# Patient Record
Sex: MALE | Race: WHITE | NOT HISPANIC OR LATINO | Employment: FULL TIME | URBAN - METROPOLITAN AREA
[De-identification: names, ages, dates, MRNs, and addresses within clinical notes are randomized per-mention and may not be internally consistent; named-entity substitution may affect disease eponyms.]

---

## 2021-01-13 ENCOUNTER — OFFICE VISIT - HEALTHEAST (OUTPATIENT)
Dept: FAMILY MEDICINE | Facility: CLINIC | Age: 56
End: 2021-01-13

## 2021-01-13 DIAGNOSIS — Z00.00 ROUTINE GENERAL MEDICAL EXAMINATION AT A HEALTH CARE FACILITY: ICD-10-CM

## 2021-01-13 DIAGNOSIS — E66.3 OVERWEIGHT: ICD-10-CM

## 2021-01-13 DIAGNOSIS — D12.6 SERRATED ADENOMA OF COLON: ICD-10-CM

## 2021-01-13 DIAGNOSIS — J30.81 CAT ALLERGIES: ICD-10-CM

## 2021-01-13 DIAGNOSIS — Q66.50 CONGENITAL PES PLANUS, UNSPECIFIED LATERALITY: ICD-10-CM

## 2021-01-13 DIAGNOSIS — Z12.5 SCREENING FOR PROSTATE CANCER: ICD-10-CM

## 2021-01-13 DIAGNOSIS — Z11.4 ENCOUNTER FOR SCREENING FOR HIV: ICD-10-CM

## 2021-01-13 DIAGNOSIS — Z11.59 NEED FOR HEPATITIS C SCREENING TEST: ICD-10-CM

## 2021-01-13 DIAGNOSIS — D49.2 ATYPICAL SQUAMOPROLIFERATIVE SKIN LESION: ICD-10-CM

## 2021-01-13 DIAGNOSIS — D12.6 TUBULAR ADENOMA OF COLON: ICD-10-CM

## 2021-01-13 LAB
CHOLEST SERPL-MCNC: 161 MG/DL
FASTING STATUS PATIENT QL REPORTED: YES
FASTING STATUS PATIENT QL REPORTED: YES
GLUCOSE BLD-MCNC: 87 MG/DL (ref 70–125)
HCV AB SERPL QL IA: NEGATIVE
HDLC SERPL-MCNC: 58 MG/DL
HIV 1+2 AB+HIV1 P24 AG SERPL QL IA: NEGATIVE
LDLC SERPL CALC-MCNC: 93 MG/DL
PSA SERPL-MCNC: 1.2 NG/ML (ref 0–3.5)
TRIGL SERPL-MCNC: 49 MG/DL

## 2021-01-13 ASSESSMENT — MIFFLIN-ST. JEOR: SCORE: 1751.85

## 2021-02-03 ENCOUNTER — AMBULATORY - HEALTHEAST (OUTPATIENT)
Dept: FAMILY MEDICINE | Facility: CLINIC | Age: 56
End: 2021-02-03

## 2021-02-03 DIAGNOSIS — D36.11 NEUROFIBROMA OF NECK: ICD-10-CM

## 2021-02-05 LAB
LAB AP CHARGES (HE HISTORICAL CONVERSION): NORMAL
PATH REPORT.COMMENTS IMP SPEC: NORMAL
PATH REPORT.FINAL DX SPEC: NORMAL
PATH REPORT.GROSS SPEC: NORMAL
PATH REPORT.MICROSCOPIC SPEC OTHER STN: NORMAL
PATH REPORT.RELEVANT HX SPEC: NORMAL
RESULT FLAG (HE HISTORICAL CONVERSION): NORMAL

## 2021-04-14 ENCOUNTER — AMBULATORY - HEALTHEAST (OUTPATIENT)
Dept: NURSING | Facility: CLINIC | Age: 56
End: 2021-04-14

## 2021-05-05 ENCOUNTER — AMBULATORY - HEALTHEAST (OUTPATIENT)
Dept: NURSING | Facility: CLINIC | Age: 56
End: 2021-05-05

## 2021-06-01 ENCOUNTER — RECORDS - HEALTHEAST (OUTPATIENT)
Dept: ADMINISTRATIVE | Facility: CLINIC | Age: 56
End: 2021-06-01

## 2021-06-05 VITALS
WEIGHT: 199 LBS | HEIGHT: 71 IN | OXYGEN SATURATION: 97 % | SYSTOLIC BLOOD PRESSURE: 100 MMHG | DIASTOLIC BLOOD PRESSURE: 68 MMHG | HEART RATE: 55 BPM | TEMPERATURE: 97.3 F | BODY MASS INDEX: 27.86 KG/M2

## 2021-06-05 VITALS
DIASTOLIC BLOOD PRESSURE: 70 MMHG | WEIGHT: 201 LBS | TEMPERATURE: 97.4 F | HEART RATE: 71 BPM | SYSTOLIC BLOOD PRESSURE: 100 MMHG | BODY MASS INDEX: 28.43 KG/M2 | OXYGEN SATURATION: 97 %

## 2021-06-14 NOTE — PROGRESS NOTES
Assessment/Plan:     1. Routine general medical examination at a health care facility  Routine healthcare maintenance.  Preventative cares reviewed.  Immunizations reviewed and patient would like to defer Shingrix immunization series until after COVID-19 pandemic in case of future vaccine availability.  Yearly physical exams to continue.  Has completed healthcare directives previously.    2. Overweight  Overweight status reviewed.  Has lost 10 pounds since September 22, 2016 due to dietary changes during current COVID-19 pandemic.  Continue encouragement for weight goal less than 190 pounds initially, less than 185 pounds ideally.  Check lipid cascade and fasting glucose.  - Lipid Cascade  - Glucose    3. Serrated adenoma of colon  Serrated adenoma of colon and tubular adenoma of colon on colonoscopy December 2, 2016 and will repeat at 5-year interval.    4. Tubular adenoma of colon  Serrated adenoma of colon and tubular adenoma of colon on colonoscopy December 2, 2016 and will repeat at 5-year interval.    5. Congenital pes planus, unspecified laterality  Pes planus deformity bilateral asymptomatic currently.    6. Cat allergies  History of cat allergies noted with ongoing avoidance.    7. Encounter for screening for HIV  Routine HIV screen, low risk.  - HIV Antigen/Antibody Screening [place]    8. Need for hepatitis C screening test  Routine hepatitis C screen, low risk.  - Hepatitis C Antibody (Anti-HCV)    9. Screening for prostate cancer  PSA for prostate cancer screening based on age criteria.  - PSA (Prostatic-Specific Antigen), Annual Screen    10.  Atypical squamoproliferative skin lesion left thigh  Patient will schedule for definitive shave biopsy on February 2, 2021.    11. Neurofibroma left posterolateral neck   As above, will schedule for punch biopsy of question neurofibroma left posterior lateral neck on 2/2/2021.       I have had an Advance Directives discussion with the patient.  The following  "high BMI interventions were performed this visit: encouragement to exercise, weight monitoring, weight loss from baseline weight and lifestyle education regarding diet.  Ensure ongoing efforts to achieve weight goal < 190 pounds initially, < 185 pounds ideally.              Subjective:      Andrew Drake is a 55 y.o. male who presents for an annual exam.  In general doing well.  Patient does have skin lesion left posterior lateral neck as well as left anterior thigh that he is interested in having removed.  Scaliness of lesion on left thigh noted.  Needs biometric paperwork completed.  Has completed advanced directives previously.  Prior colonoscopy 2016 with tubular adenoma and serrated adenoma of colon told to repeat 5-year interval.  History of cat allergies.  Pes planus deformity bilaterally.  No chest pain.  No palpitations.  No shortness of breath with exertion.  Denies recent illness.  Has not had COVID-19 as far as he knows.  Denies personal or family history of skin cancer.  Past medical social and family history reviewed and updated as noted below.  Gets up perhaps once a night to go to the bathroom.  Has cut back from 2 or 3 Coke or Pepsi drinks down to 1/day and no longer going out for restaurant lunches.    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 pacemaker (shocked as an  with issues following...)  4 sis -   Fraternal twin arina Hernandes (his children with alpha-1 antitrypsan def requiring liver transplants)      Surgeries: left knee medial meniscus repair (Dr. Manley); s/p vasectomy   H/O jaw fracture during baseball   Tdap 10/13/06 (then Td 16)      Healthy Habits: "   Regular Exercise: Yes  Healthy Diet: Yes  Dental Visits Regularly: Yes  Seat Belt: Yes   Sexually active: Yes  Colonoscopy: Yes and 12/2/16 - repeat in 5 years  Lipid Profile: Yes  Glucose Screen: Yes    Immunization History   Administered Date(s) Administered     Influenza, inj, historic,unspecified 10/01/2020     Influenza, seasonal,quad inj 6-35 mos 10/16/2010     Influenza,seasonal,quad inj =/> 6months 09/22/2016     Td, adult adsorbed, PF 09/22/2016     Td,adult,historic,unspecified 04/09/1997, 10/13/2006     Tdap 10/13/2006     Immunization status: up to date and documented, will complete Shingrix series next year per patient request.  Vision Screening:both eyes  Hearing: PASS     Current Outpatient Medications   Medication Sig Dispense Refill     triamcinolone (KENALOG) 0.1 % cream apply topically to affected areas twice daily as needed 45 g 1     No current facility-administered medications for this visit.      No past medical history on file.  Past Surgical History:   Procedure Laterality Date     VASECTOMY       Patient has no known allergies.  Family History   Problem Relation Age of Onset     Diabetes Father      Heart disease Father      Social History     Socioeconomic History     Marital status:      Spouse name: Not on file     Number of children: Not on file     Years of education: Not on file     Highest education level: Not on file   Occupational History     Not on file   Social Needs     Financial resource strain: Not on file     Food insecurity     Worry: Not on file     Inability: Not on file     Transportation needs     Medical: Not on file     Non-medical: Not on file   Tobacco Use     Smoking status: Never Smoker     Smokeless tobacco: Never Used   Substance and Sexual Activity     Alcohol use: Yes     Drug use: No     Sexual activity: Not on file   Lifestyle     Physical activity     Days per week: Not on file     Minutes per session: Not on file     Stress: Not on file  "  Relationships     Social connections     Talks on phone: Not on file     Gets together: Not on file     Attends Yazdanism service: Not on file     Active member of club or organization: Not on file     Attends meetings of clubs or organizations: Not on file     Relationship status: Not on file     Intimate partner violence     Fear of current or ex partner: Not on file     Emotionally abused: Not on file     Physically abused: Not on file     Forced sexual activity: Not on file   Other Topics Concern     Not on file   Social History Narrative     Not on file       Review of Systems  Comprehensive ROS: as above, otherwise all negative.           Objective:     /68   Pulse (!) 55   Temp 97.3  F (36.3  C)   Ht 5' 10.5\" (1.791 m)   Wt 199 lb (90.3 kg)   SpO2 97%   BMI 28.15 kg/m    Body mass index is 28.15 kg/m .    Physical    General Appearance:    Alert, cooperative, no distress, appears stated age.  BMI = 28.15.   Head:    Normocephalic, without obvious abnormality, atraumatic   Eyes:    PERRL, conjunctiva/corneas clear, EOM's intact, fundi     benign, both eyes        Ears:    Normal TM's and external ear canals, both ears   Nose:   Nares normal, septum midline, mucosa normal, no drainage    or sinus tenderness   Throat:   Lips, mucosa, and tongue normal; teeth and gums normal   Neck:   Supple, symmetrical, trachea midline, no adenopathy;        thyroid:  No enlargement/tenderness/nodules; no carotid    bruit or JVD   Back:     Symmetric, no curvature, ROM normal, no CVA tenderness   Lungs:     Clear to auscultation bilaterally, respirations unlabored   Chest wall:    No tenderness or deformity   Heart:    Regular rate and rhythm, S1 and S2 normal, no murmur, rub   or gallop   Abdomen:     Soft, non-tender, bowel sounds active all four quadrants,     no masses, no organomegaly.     Genitalia:    Normal male without lesion, discharge or tenderness.  No inguinal hernia noted.     Rectal:    Normal tone. "  Prostate normal/symmetric, no masses or tenderness.   Extremities:   Extremities normal, atraumatic, no cyanosis or edema.  Pes planus deformity bilateral.   Pulses:   2+ and symmetric all extremities   Skin:   Skin color, texture, turgor normal, no rashes.  Left posterior lateral neck lesion measuring approximately 6 mm consistent with neurofibroma.  Left anterior thigh squama proliferative lesion slightly raised without ulceration or excoriation.  Mild hyperpigmentation only.   Lymph nodes:   Cervical, supraclavicular, and axillary nodes normal   Neurologic:   CNII-XII intact. Normal strength, sensation and reflexes       throughout                This note has been dictated using voice recognition software and as a result may contain minor grammatical errors and unintended word substitutions.

## 2021-06-14 NOTE — PROGRESS NOTES
"Left posterolateral neck skin lesion biopsy    Date/Time: 2/3/2021 11:39 AM  Performed by: Yazan Sosa MD  Authorized by: Yazan Sosa MD   Consent: Verbal consent obtained. Written consent not obtained.  Consent given by: patient  Patient understanding: patient states understanding of the procedure being performed  Required items: required blood products, implants, devices, and special equipment available  Patient identity confirmed: verbally with patient  Time out: Immediately prior to procedure a \"time out\" was called to verify the correct patient, procedure, equipment, support staff and site/side marked as required.  Preparation: Patient was prepped and draped in the usual sterile fashion.  Local anesthesia used: yes    Anesthesia:  Local anesthesia used: yes    Sedation:  Patient sedated: no    Patient tolerance: Patient tolerated the procedure well with no immediate complications  Comments: Patient prepped and draped in usual sterile fashion with 1% lidocaine with epinephrine injected at the base of 5 mm x 4 mm raised lesion left posterior lateral neck.  8 mm punch biopsy was then performed tolerated well.  Lesion removed in its entirety.  Reapproximation of excision site with 4-0 Ethilon suture in simple interrupted fashion x2.  Good hemostasis achieved.  Antibiotic with dressing applied following.  Anticipate suture removal in 10 to 14 days.  Specimen sent for pathology to determine if neurofibroma versus other.          "

## 2021-06-16 PROBLEM — D12.6 SERRATED ADENOMA OF COLON: Status: ACTIVE | Noted: 2021-01-13

## 2021-06-16 PROBLEM — D12.6 TUBULAR ADENOMA OF COLON: Status: ACTIVE | Noted: 2021-01-13

## 2021-07-04 NOTE — ADDENDUM NOTE
Addendum Note by Trena Landis MD at 2/3/2021 10:40 AM     Author: Trena Landis MD Service: -- Author Type: Physician    Filed: 2/3/2021 12:04 PM Encounter Date: 2/3/2021 Status: Signed    : Trena Landis MD (Physician)    Addended by: TRENA LANDIS on: 2/3/2021 12:04 PM        Modules accepted: Orders

## 2021-10-16 ENCOUNTER — HEALTH MAINTENANCE LETTER (OUTPATIENT)
Age: 56
End: 2021-10-16

## 2021-12-30 ENCOUNTER — IMMUNIZATION (OUTPATIENT)
Dept: NURSING | Facility: CLINIC | Age: 56
End: 2021-12-30
Payer: COMMERCIAL

## 2021-12-30 PROCEDURE — 0004A PR COVID VAC PFIZER DIL RECON 30 MCG/0.3 ML IM: CPT

## 2021-12-30 PROCEDURE — 91300 PR COVID VAC PFIZER DIL RECON 30 MCG/0.3 ML IM: CPT

## 2022-02-18 ENCOUNTER — OFFICE VISIT (OUTPATIENT)
Dept: FAMILY MEDICINE | Facility: CLINIC | Age: 57
End: 2022-02-18
Payer: COMMERCIAL

## 2022-02-18 VITALS
HEIGHT: 71 IN | WEIGHT: 204 LBS | SYSTOLIC BLOOD PRESSURE: 110 MMHG | DIASTOLIC BLOOD PRESSURE: 60 MMHG | HEART RATE: 62 BPM | OXYGEN SATURATION: 97 % | BODY MASS INDEX: 28.56 KG/M2

## 2022-02-18 DIAGNOSIS — J30.81 CAT ALLERGIES: ICD-10-CM

## 2022-02-18 DIAGNOSIS — D12.6 TUBULAR ADENOMA OF COLON: ICD-10-CM

## 2022-02-18 DIAGNOSIS — D12.6 SERRATED ADENOMA OF COLON: ICD-10-CM

## 2022-02-18 DIAGNOSIS — Q66.50 CONGENITAL PES PLANUS, UNSPECIFIED LATERALITY: ICD-10-CM

## 2022-02-18 DIAGNOSIS — E66.3 OVERWEIGHT: ICD-10-CM

## 2022-02-18 DIAGNOSIS — Z00.00 ROUTINE PHYSICAL EXAMINATION: Primary | ICD-10-CM

## 2022-02-18 DIAGNOSIS — Z23 ENCOUNTER FOR IMMUNIZATION: ICD-10-CM

## 2022-02-18 DIAGNOSIS — Z12.5 SCREENING FOR PROSTATE CANCER: ICD-10-CM

## 2022-02-18 LAB
CHOLEST SERPL-MCNC: 161 MG/DL
FASTING STATUS PATIENT QL REPORTED: YES
GLUCOSE BLD-MCNC: 77 MG/DL (ref 60–99)
HDLC SERPL-MCNC: 52 MG/DL
LDLC SERPL CALC-MCNC: 100 MG/DL
PSA SERPL-MCNC: 3.78 UG/L (ref 0–3.5)
TRIGL SERPL-MCNC: 46 MG/DL

## 2022-02-18 PROCEDURE — 90471 IMMUNIZATION ADMIN: CPT | Performed by: FAMILY MEDICINE

## 2022-02-18 PROCEDURE — 99396 PREV VISIT EST AGE 40-64: CPT | Mod: 25 | Performed by: FAMILY MEDICINE

## 2022-02-18 PROCEDURE — G0103 PSA SCREENING: HCPCS | Performed by: FAMILY MEDICINE

## 2022-02-18 PROCEDURE — 80061 LIPID PANEL: CPT | Performed by: FAMILY MEDICINE

## 2022-02-18 PROCEDURE — 90750 HZV VACC RECOMBINANT IM: CPT | Performed by: FAMILY MEDICINE

## 2022-02-18 PROCEDURE — 36415 COLL VENOUS BLD VENIPUNCTURE: CPT | Performed by: FAMILY MEDICINE

## 2022-02-18 PROCEDURE — 82947 ASSAY GLUCOSE BLOOD QUANT: CPT | Performed by: FAMILY MEDICINE

## 2022-02-18 NOTE — PROGRESS NOTES
Assessment/Plan:     Routine physical examination  Routine healthcare maintenance.  Preventative cares reviewed.  Annual physical exams to continue  - REVIEW OF HEALTH MAINTENANCE PROTOCOL ORDERS    Tubular adenoma of colon  History of both tubular and serrated adenomas of colon does have schedule colonoscopy April 8, 2022 noted.    Serrated adenoma of colon  History of both tubular and serrated adenomas of colon does have schedule colonoscopy April 8, 2022 noted.    Cat allergies  History of cat allergies.  Avoidance of trigger.    Congenital pes planus, unspecified laterality  Pes planus deformity bilateral, mild otherwise asymptomatic currently.  Arch support orthotic as needed    Overweight  5 pound weight gain since January 13, 2021 and will maintain weight goal less than 195 pounds initially, less than 190 pounds ideally.   - Glucose whole blood  - Lipid panel reflex to direct LDL Fasting    Screening for prostate cancer  PSA for prostate cancer screening.  - Prostate Specific Antigen Screen    Encounter for immunization  Shingrix immunization provided with booster anticipated in 2 to 6 months.  - ZOSTER VACCINE RECOMBINANT (Shingrix)       I have had an Advance Directives discussion with the patient.  The following high BMI interventions were performed this visit: encouragement to exercise, weight monitoring, weight loss from baseline weight and lifestyle education regarding diet.  Ensure ongoing efforts to achieve weight goal < 195 pounds initially, < 190 pounds ideally.           Subjective:     Andrew Drake is a 56 year old male who presents for an annual exam.  In general doing well.  History of both tubular and serrated adenomas of colon and was to have colonoscopy from December 2, 2016 repeated at 5-year interval.  This is scheduled for April 8, 2022.  Needs Shingrix immunization to begin series.  Needs biometric paperwork for employer completed.  5 pound weight gain since physical January 13,  " otherwise tries to stay active.  Denies recent illness.  Immunizations reviewed and otherwise up-to-date.      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 pacemaker (shocked as an  with issues following...)   4 sis -   Fraternal twin arina Hernandes (his children with alpha-1 antitrypsan def requiring liver transplants)      Surgeries: left knee medial meniscus repair (Dr. Manley); s/p vasectomy   H/O jaw fracture during baseball   Tdap 10/13/06 (then Td 16)        Immunization History   Administered Date(s) Administered     COVID-19,PF,Pfizer (12+ Yrs) 2021, 2021, 2021     Flu, Unspecified 10/01/2020     Influenza Vaccine IM > 6 months Valent IIV4 (Alfuria,Fluzone) 10/06/2020, 10/03/2021     Influenza Vaccine, 6+MO IM (QUADRIVALENT W/PRESERVATIVES) 10/16/2010, 2016     TD (ADULT, 7+) 2016     Td,adult,historic,unspecified 1997, 10/13/2006     Tdap (Adacel,Boostrix) 10/13/2006     Zoster vaccine recombinant adjuvanted (SHINGRIX) 2022     Immunization status: Shingrix immunization provided with booster anticipated in 2 to 6 months otherwise immunizations reviewed and up-to-date.    Current Outpatient " "Medications   Medication Sig Dispense Refill     triamcinolone (KENALOG) 0.1 % cream [TRIAMCINOLONE (KENALOG) 0.1 % CREAM] apply topically to affected areas twice daily as needed (Patient not taking: Reported on 2/18/2022) 45 g 1     No past medical history on file.  Past Surgical History:   Procedure Laterality Date     VASECTOMY       Patient has no known allergies.  Family History   Problem Relation Age of Onset     Diabetes Father      Heart Disease Father      Social History     Socioeconomic History     Marital status:      Spouse name: Not on file     Number of children: Not on file     Years of education: Not on file     Highest education level: Not on file   Occupational History     Not on file   Tobacco Use     Smoking status: Never Smoker     Smokeless tobacco: Never Used   Substance and Sexual Activity     Alcohol use: Yes     Drug use: No     Sexual activity: Not on file   Other Topics Concern     Not on file   Social History Narrative     Not on file     Social Determinants of Health     Financial Resource Strain: Not on file   Food Insecurity: Not on file   Transportation Needs: Not on file   Physical Activity: Not on file   Stress: Not on file   Social Connections: Not on file   Intimate Partner Violence: Not on file   Housing Stability: Not on file       Review of Systems  Comprehensive ROS: as above, otherwise all negative.           Objective:     /60   Pulse 62   Ht 1.791 m (5' 10.5\")   Wt 92.5 kg (204 lb)   SpO2 97%   BMI 28.86 kg/m    Body mass index is 28.86 kg/m .    Physical    General Appearance:    Alert, cooperative, no distress, appears stated age.     Head:    Normocephalic, without obvious abnormality, atraumatic   Eyes:    PERRL, conjunctiva/corneas clear, EOM's intact, fundi     benign, both eyes.  Glasses.        Ears:    Normal TM's and external ear canals, both ears   Nose:   Nares normal, septum midline, mucosa normal, no drainage    or sinus tenderness "   Throat:   Lips, mucosa, and tongue normal; teeth and gums normal   Neck:   Supple, symmetrical, trachea midline, no adenopathy;        thyroid:  No enlargement/tenderness/nodules; no carotid    bruit or JVD   Back:     Symmetric, no curvature, ROM normal, no CVA tenderness   Lungs:     Clear to auscultation bilaterally, respirations unlabored   Chest wall:    No tenderness or deformity   Heart:    Regular rate and rhythm, S1 and S2 normal, no murmur, rub   or gallop   Abdomen:     Soft, non-tender, bowel sounds active all four quadrants,     no masses, no organomegaly.  Small reducible umbilical hernia.  Asymptomatic.   Genitalia:    Normal male without lesion, discharge or tenderness.  No inguinal hernia noted.     Rectal:    Normal tone.  Prostate normal/symmetric, no masses or tenderness.   Extremities:   Extremities normal, atraumatic, no cyanosis or edema.  Pes planus deformity bilateral feet.   Pulses:   2+ and symmetric all extremities   Skin:   Skin color, texture, turgor normal, no rashes or lesions   Lymph nodes:   Cervical, supraclavicular, and axillary nodes normal   Neurologic:   CNII-XII intact. Normal strength, sensation and reflexes       throughout                This note has been dictated using voice recognition software and as a result may contain minor grammatical errors and unintended word substitutions.

## 2022-02-18 NOTE — PROGRESS NOTES
Answers for HPI/ROS submitted by the patient on 2/16/2022  Frequency of exercise:: 6-7 days/week  Getting at least 3 servings of Calcium per day:: Yes  Diet:: Regular (no restrictions)  Taking medications regularly:: Not Applicable  Medication side effects:: Not applicable  Bi-annual eye exam:: Yes  Dental care twice a year:: Yes  Sleep apnea or symptoms of sleep apnea:: None  Additional concerns today:: No  Duration of exercise:: 45-60 minutes

## 2022-02-19 DIAGNOSIS — R97.20 ELEVATED PROSTATE SPECIFIC ANTIGEN (PSA): Primary | ICD-10-CM

## 2022-03-18 ENCOUNTER — LAB (OUTPATIENT)
Dept: LAB | Facility: CLINIC | Age: 57
End: 2022-03-18
Payer: COMMERCIAL

## 2022-03-18 DIAGNOSIS — R97.20 ELEVATED PROSTATE SPECIFIC ANTIGEN (PSA): ICD-10-CM

## 2022-03-18 LAB — PSA SERPL-MCNC: 1.12 UG/L (ref 0–3.5)

## 2022-03-18 PROCEDURE — 36415 COLL VENOUS BLD VENIPUNCTURE: CPT

## 2022-03-18 PROCEDURE — 84153 ASSAY OF PSA TOTAL: CPT

## 2022-04-08 ENCOUNTER — TRANSFERRED RECORDS (OUTPATIENT)
Dept: HEALTH INFORMATION MANAGEMENT | Facility: CLINIC | Age: 57
End: 2022-04-08
Payer: COMMERCIAL

## 2022-05-26 ENCOUNTER — ALLIED HEALTH/NURSE VISIT (OUTPATIENT)
Dept: FAMILY MEDICINE | Facility: CLINIC | Age: 57
End: 2022-05-26
Payer: COMMERCIAL

## 2022-05-26 DIAGNOSIS — Z23 ENCOUNTER FOR IMMUNIZATION: Primary | ICD-10-CM

## 2022-05-26 DIAGNOSIS — Z23 ENCOUNTER FOR IMMUNIZATION: ICD-10-CM

## 2022-05-26 PROCEDURE — 90471 IMMUNIZATION ADMIN: CPT

## 2022-05-26 PROCEDURE — 90750 HZV VACC RECOMBINANT IM: CPT

## 2022-05-26 PROCEDURE — 99207 PR NO CHARGE NURSE ONLY: CPT

## 2022-09-25 ENCOUNTER — HEALTH MAINTENANCE LETTER (OUTPATIENT)
Age: 57
End: 2022-09-25

## 2022-10-14 ENCOUNTER — IMMUNIZATION (OUTPATIENT)
Dept: FAMILY MEDICINE | Facility: CLINIC | Age: 57
End: 2022-10-14
Payer: COMMERCIAL

## 2022-10-14 PROCEDURE — 0124A COVID-19,PF,PFIZER BOOSTER BIVALENT: CPT

## 2022-10-14 PROCEDURE — 90682 RIV4 VACC RECOMBINANT DNA IM: CPT

## 2022-10-14 PROCEDURE — 90471 IMMUNIZATION ADMIN: CPT

## 2022-10-14 PROCEDURE — 91312 COVID-19,PF,PFIZER BOOSTER BIVALENT: CPT

## 2022-10-28 ENCOUNTER — MYC MEDICAL ADVICE (OUTPATIENT)
Dept: FAMILY MEDICINE | Facility: CLINIC | Age: 57
End: 2022-10-28

## 2023-05-13 ENCOUNTER — HEALTH MAINTENANCE LETTER (OUTPATIENT)
Age: 58
End: 2023-05-13

## 2023-09-15 ENCOUNTER — OFFICE VISIT (OUTPATIENT)
Dept: URGENT CARE | Facility: URGENT CARE | Age: 58
End: 2023-09-15
Payer: COMMERCIAL

## 2023-09-15 VITALS
TEMPERATURE: 98 F | HEART RATE: 78 BPM | RESPIRATION RATE: 20 BRPM | DIASTOLIC BLOOD PRESSURE: 87 MMHG | SYSTOLIC BLOOD PRESSURE: 151 MMHG | OXYGEN SATURATION: 98 %

## 2023-09-15 DIAGNOSIS — H00.012 HORDEOLUM EXTERNUM OF RIGHT LOWER EYELID: Primary | ICD-10-CM

## 2023-09-15 DIAGNOSIS — H01.002 BLEPHARITIS OF RIGHT LOWER EYELID, UNSPECIFIED TYPE: ICD-10-CM

## 2023-09-15 PROCEDURE — 99203 OFFICE O/P NEW LOW 30 MIN: CPT | Performed by: STUDENT IN AN ORGANIZED HEALTH CARE EDUCATION/TRAINING PROGRAM

## 2023-09-15 RX ORDER — ERYTHROMYCIN 5 MG/G
0.5 OINTMENT OPHTHALMIC 4 TIMES DAILY
Qty: 10 G | Refills: 0 | Status: SHIPPED | OUTPATIENT
Start: 2023-09-15 | End: 2023-09-20

## 2023-09-15 NOTE — PROGRESS NOTES
Assessment & Plan     Hordeolum externum of right lower eyelid  Blepharitis of right lower eyelid, unspecified type  58-year-old man who presents with 2 days of lower right eyelid redness and swelling due to hordeolum externum and blepharitis.  Vitals notable for /87, otherwise within normal limits.  On exam, PERRL, conjunctivae and sclerae normal, and eyelids- hordeolum/sty right lower eyelid with a bump and overlying erythema.  Plan: Treatment with erythromycin ointment, warm compresses.  Discussed return precautions.  The patient's questions were addressed.  - erythromycin (ROMYCIN) 5 MG/GM ophthalmic ointment  Dispense: 10 g; Refill: 0               No follow-ups on file.    Veronique Callahan MD  [facility] CAROL Morales is a 58 year old male who presents to clinic today for the following health issues:  Chief Complaint   Patient presents with    Eye Problem     R eye pain and swollen      HPI    Eye Problem    Onset of symptoms was 2 day(s) ago.   Location: right eye   Current and Associated symptoms: lower eyelid redness and swelling  Treatment measures tried include warm packs  Context: none  Denies fever    Review of Systems  Constitutional, HEENT, cardiovascular, pulmonary, gi and gu systems are negative, except as otherwise noted.      Objective    BP (!) 151/87   Pulse 78   Temp 98  F (36.7  C) (Tympanic)   Resp 20   SpO2 98%   Physical Exam   GENERAL: healthy, alert and no distress, nontoxic  EYES: Eyes grossly normal to inspection, PERRL and conjunctivae and sclerae normal  HENT: nose and mouth without ulcers or lesions  NECK: no adenopathy  RESP: Breathing comfortably on room air  MS: no gross musculoskeletal defects noted, no edema  SKIN: no suspicious lesions or rashes  NEURO: No focal neurologic deficits    EYES: PERRL, conjunctivae and sclerae normal, and eyelids- hordeolum/sty right lower eyelid with a bump and overlying erythema    No results found for  this or any previous visit (from the past 24 hour(s)).

## 2024-06-12 ENCOUNTER — OFFICE VISIT (OUTPATIENT)
Dept: URGENT CARE | Facility: URGENT CARE | Age: 59
End: 2024-06-12
Payer: COMMERCIAL

## 2024-06-12 VITALS
DIASTOLIC BLOOD PRESSURE: 81 MMHG | RESPIRATION RATE: 16 BRPM | BODY MASS INDEX: 30.7 KG/M2 | TEMPERATURE: 96.8 F | WEIGHT: 217 LBS | SYSTOLIC BLOOD PRESSURE: 123 MMHG | HEART RATE: 55 BPM | OXYGEN SATURATION: 97 %

## 2024-06-12 DIAGNOSIS — H61.23 BILATERAL IMPACTED CERUMEN: Primary | ICD-10-CM

## 2024-06-12 PROCEDURE — 99213 OFFICE O/P EST LOW 20 MIN: CPT | Performed by: PHYSICIAN ASSISTANT

## 2024-06-12 NOTE — PROGRESS NOTES
Assessment & Plan     1. Bilateral impacted cerumen  Removed in the clinic by nurse without problem. Patients symptoms completely resolved following removal.       Diagnosis and treatment plan was reviewed with patient and/or family.   We went over any labs or imaging. Discussed worsening symptoms or little to no relief despite treatment plan to follow-up with PCP or return to clinic.  Patient verbalizes understanding. All questions were addressed and answered.     Lou Arias PA-C  [facility] [place]    CHIEF COMPLAINT:   Chief Complaint   Patient presents with    Plugged Ears     2 days, left ear     Subjective     Andrew is a 58 year old male who presents to clinic today for evaluation of left ear feeling plugged. Symptoms started 2 days ago, but has been intermittent.   He has tried using hydrogen peroxide and rinsing out his ears.     Patient denies fever, chills, pain, drainage from ears, tinnitus, pain on the outer ear, recent URI symptoms or recent swimming.         No past medical history on file.  Past Surgical History:   Procedure Laterality Date    VASECTOMY       Social History     Tobacco Use    Smoking status: Never    Smokeless tobacco: Never   Substance Use Topics    Alcohol use: Yes     No current outpatient medications on file.     No current facility-administered medications for this visit.     No Known Allergies    10 point ROS of systems were all negative except for pertinent positives noted in my HPI.      Exam:   /81   Pulse 55   Temp 96.8  F (36  C)   Resp 16   Wt 98.4 kg (217 lb)   SpO2 97%   BMI 30.70 kg/m    Constitutional: healthy, alert and no distress  ENT: L canal with cerumen impaction, TM not visualized. R canal is partially occluded with cerumen. nasal mucosa pink and moist, throat without tonsillar hypertrophy or erythema  Neck: neck is supple, no cervical lymphadenopathy or nuchal rigidity  Skin: no rashes  Neurologic: Speech clear, gait  normal. Moves all extremities.    No results found for any visits on 06/12/24.

## 2024-07-20 ENCOUNTER — HEALTH MAINTENANCE LETTER (OUTPATIENT)
Age: 59
End: 2024-07-20

## 2025-04-19 ENCOUNTER — OFFICE VISIT (OUTPATIENT)
Dept: URGENT CARE | Facility: URGENT CARE | Age: 60
End: 2025-04-19
Payer: COMMERCIAL

## 2025-04-19 VITALS
RESPIRATION RATE: 16 BRPM | HEIGHT: 71 IN | WEIGHT: 214 LBS | TEMPERATURE: 97.6 F | BODY MASS INDEX: 29.96 KG/M2 | OXYGEN SATURATION: 96 % | HEART RATE: 53 BPM | DIASTOLIC BLOOD PRESSURE: 77 MMHG | SYSTOLIC BLOOD PRESSURE: 122 MMHG

## 2025-04-19 DIAGNOSIS — L03.213 PRESEPTAL CELLULITIS OF LEFT UPPER EYELID: ICD-10-CM

## 2025-04-19 DIAGNOSIS — H00.014 HORDEOLUM EXTERNUM OF LEFT UPPER EYELID: Primary | ICD-10-CM

## 2025-04-19 PROCEDURE — 3074F SYST BP LT 130 MM HG: CPT | Performed by: PHYSICIAN ASSISTANT

## 2025-04-19 PROCEDURE — 3078F DIAST BP <80 MM HG: CPT | Performed by: PHYSICIAN ASSISTANT

## 2025-04-19 PROCEDURE — 99214 OFFICE O/P EST MOD 30 MIN: CPT | Performed by: PHYSICIAN ASSISTANT

## 2025-04-19 RX ORDER — POLYMYXIN B SULFATE AND TRIMETHOPRIM 1; 10000 MG/ML; [USP'U]/ML
1-2 SOLUTION OPHTHALMIC EVERY 6 HOURS
Qty: 10 ML | Refills: 0 | Status: SHIPPED | OUTPATIENT
Start: 2025-04-19 | End: 2025-04-24

## 2025-04-19 NOTE — PATIENT INSTRUCTIONS
April 19, 2025 [facility] Plan:     Start the Polytrim eye antibiotic drops I prescribed   2. Start the Augmentin oral antibiotic I prescribed   3. Continue gentle, warm, compress to the left upper eyelid area, several times daily until resolved   4.  Follow-up with primary care provider or your eye doctor if no improvement after 2-3 days of treatment, or sooner if any new or worsening symptoms.   5. Go the the emergency room if you develop any sudden, severe worsening (such as spreading redness and swelling around eye and face, fever, or visual changes)

## 2025-04-19 NOTE — PROGRESS NOTES
Urgent Care Clinic Visit    Chief Complaint   Patient presents with    Urgent Care     Left eye swollen, noticed 2 days ago, did put some eye drop and use warm compression on it, but its not getting better                4/19/2025    11:22 AM   Additional Questions   Roomed by Sue Garza   Accompanied by self         4/19/2025    11:22 AM   Patient Reported Additional Medications   Patient reports taking the following new medications Erythromycin ointment 3.5gm, multivitamins and supplements

## 2025-04-19 NOTE — PROGRESS NOTES
ASSESSMENT/PLAN:    (H00.014) Hordeolum externum of left upper eyelid  (primary encounter diagnosis)    MDM: Stye and left upper eyelid preseptal cellulitis. No periorbital cellulitis. No acute visual changes. No fever or systemic symptoms.   Patient is educated about how to watch for periorbital cellulitis and is educated about urgent and emergent follow-up criteria.  Please see below after visit summary/plan (which I reviewed with patient verbally, provided in printed form, and provided in MyChart for home review).    Plan: polymixin b-trimethoprim (POLYTRIM) 00841-4.1         UNIT/ML-% ophthalmic solution          AVS/Plan-    April 19, 2025 [facility] Plan:     Start the Polytrim eye antibiotic drops I prescribed   2. Start the Augmentin oral antibiotic I prescribed   3. Continue gentle, warm, compress to the left upper eyelid area, several times daily until resolved   4.  Follow-up with primary care provider or your eye doctor if no improvement after 2-3 days of treatment, or sooner if any new or worsening symptoms.   5. Go the the emergency room if you develop any sudden, severe worsening (such as spreading redness and swelling around eye and face, fever, or visual changes)     (L03.213) Preseptal cellulitis of left upper eyelid  Plan: amoxicillin-clavulanate (AUGMENTIN) 875-125 MG         tablet            This progress note has been dictated, with use of voice recognition software. Any grammatical, typographical, or context errors are unintentional and inherent to use of voice recognition software.  -------------    Chief Complaint   Patient presents with    Urgent Care     Left eye swollen , noticed 2 days ago, did put some eye drop and use warm compression on it, but its not getting better          SUBJECTIVE: nAdrew ERNANDEZ Noelle presents to urgent care today for evaluation of left upper eyelid tenderness, redness, and swelling    Patient developed a tender lump in the left upper eyelid on Thursday (2  "days ago).  He had some leftover erythromycin ointment from a prior stye in his right eye.  He has been taking that for 2 days but has not noted any improvement-instead he notes increased redness and swelling of the left upper eyelid today prompting today's urgent care visit.    Ros: No eye pain. No hx of injury or trauma to eye. No use of contact lenses. No acute change in visual acuity. No fever, chills, no URI symptoms. No other systemic sxs.  Patient otherwise feels healthy/well.    OBJECTIVE:   /77 (BP Location: Right arm, Patient Position: Sitting, Cuff Size: Adult Large)   Pulse 53   Temp 97.6  F (36.4  C) (Tympanic)   Resp 16   Ht 1.803 m (5' 11\")   Wt 97.1 kg (214 lb)   SpO2 96%   BMI 29.85 kg/m        GENERAL: Alert. NAD.   HEENT:   EYES:  Hordeolum noted LEFT upper eyelid. left lower lid unremarkable. Lefty upper lid is deeply erythematous and mildly swollen (but not swollen shut). Left lower lid is unremarkable.  PERRLA, fundi normal. Visual acuity grossly normal. Conjunctiva without infection.  Sclera clear. No periorbital tenderness, redness or swelling.   NEURO: Alert and oriented.  Normal speech and mentation.  CN II/XII grossly intact.  Gait within normal limits.              "

## 2025-05-15 SDOH — HEALTH STABILITY: PHYSICAL HEALTH: ON AVERAGE, HOW MANY DAYS PER WEEK DO YOU ENGAGE IN MODERATE TO STRENUOUS EXERCISE (LIKE A BRISK WALK)?: 6 DAYS

## 2025-05-15 SDOH — HEALTH STABILITY: PHYSICAL HEALTH: ON AVERAGE, HOW MANY MINUTES DO YOU ENGAGE IN EXERCISE AT THIS LEVEL?: 50 MIN

## 2025-05-15 ASSESSMENT — SOCIAL DETERMINANTS OF HEALTH (SDOH): HOW OFTEN DO YOU GET TOGETHER WITH FRIENDS OR RELATIVES?: TWICE A WEEK

## 2025-05-20 ENCOUNTER — RESULTS FOLLOW-UP (OUTPATIENT)
Dept: FAMILY MEDICINE | Facility: CLINIC | Age: 60
End: 2025-05-20

## 2025-05-20 ENCOUNTER — OFFICE VISIT (OUTPATIENT)
Dept: FAMILY MEDICINE | Facility: CLINIC | Age: 60
End: 2025-05-20
Payer: COMMERCIAL

## 2025-05-20 ENCOUNTER — PATIENT OUTREACH (OUTPATIENT)
Dept: GASTROENTEROLOGY | Facility: CLINIC | Age: 60
End: 2025-05-20

## 2025-05-20 VITALS
OXYGEN SATURATION: 99 % | DIASTOLIC BLOOD PRESSURE: 78 MMHG | WEIGHT: 205 LBS | TEMPERATURE: 98.2 F | BODY MASS INDEX: 28.7 KG/M2 | HEIGHT: 71 IN | SYSTOLIC BLOOD PRESSURE: 122 MMHG | HEART RATE: 60 BPM | RESPIRATION RATE: 16 BRPM

## 2025-05-20 DIAGNOSIS — Q66.50 CONGENITAL PES PLANUS, UNSPECIFIED LATERALITY: ICD-10-CM

## 2025-05-20 DIAGNOSIS — D12.6 SERRATED ADENOMA OF COLON: ICD-10-CM

## 2025-05-20 DIAGNOSIS — Z23 ENCOUNTER FOR IMMUNIZATION: ICD-10-CM

## 2025-05-20 DIAGNOSIS — Z00.00 ROUTINE PHYSICAL EXAMINATION: Primary | ICD-10-CM

## 2025-05-20 DIAGNOSIS — E66.3 OVERWEIGHT: ICD-10-CM

## 2025-05-20 DIAGNOSIS — R97.20 ELEVATED PROSTATE SPECIFIC ANTIGEN (PSA): ICD-10-CM

## 2025-05-20 LAB
ANION GAP SERPL CALCULATED.3IONS-SCNC: 11 MMOL/L (ref 7–15)
BUN SERPL-MCNC: 18.3 MG/DL (ref 8–23)
CALCIUM SERPL-MCNC: 9.7 MG/DL (ref 8.8–10.4)
CHLORIDE SERPL-SCNC: 105 MMOL/L (ref 98–107)
CHOLEST SERPL-MCNC: 148 MG/DL
CREAT SERPL-MCNC: 0.99 MG/DL (ref 0.67–1.17)
EGFRCR SERPLBLD CKD-EPI 2021: 88 ML/MIN/1.73M2
EST. AVERAGE GLUCOSE BLD GHB EST-MCNC: 97 MG/DL
FASTING STATUS PATIENT QL REPORTED: YES
FASTING STATUS PATIENT QL REPORTED: YES
GLUCOSE SERPL-MCNC: 96 MG/DL (ref 70–99)
HBA1C MFR BLD: 5 % (ref 0–5.6)
HCO3 SERPL-SCNC: 25 MMOL/L (ref 22–29)
HDLC SERPL-MCNC: 59 MG/DL
LDLC SERPL CALC-MCNC: 78 MG/DL
NONHDLC SERPL-MCNC: 89 MG/DL
POTASSIUM SERPL-SCNC: 4.9 MMOL/L (ref 3.4–5.3)
PSA SERPL DL<=0.01 NG/ML-MCNC: 1.61 NG/ML (ref 0–3.5)
SODIUM SERPL-SCNC: 141 MMOL/L (ref 135–145)
TRIGL SERPL-MCNC: 54 MG/DL

## 2025-05-20 PROCEDURE — 83036 HEMOGLOBIN GLYCOSYLATED A1C: CPT | Performed by: FAMILY MEDICINE

## 2025-05-20 PROCEDURE — G2211 COMPLEX E/M VISIT ADD ON: HCPCS | Performed by: FAMILY MEDICINE

## 2025-05-20 PROCEDURE — 90677 PCV20 VACCINE IM: CPT | Performed by: FAMILY MEDICINE

## 2025-05-20 PROCEDURE — 99396 PREV VISIT EST AGE 40-64: CPT | Mod: 25 | Performed by: FAMILY MEDICINE

## 2025-05-20 PROCEDURE — 84153 ASSAY OF PSA TOTAL: CPT | Performed by: FAMILY MEDICINE

## 2025-05-20 PROCEDURE — 3078F DIAST BP <80 MM HG: CPT | Performed by: FAMILY MEDICINE

## 2025-05-20 PROCEDURE — 90471 IMMUNIZATION ADMIN: CPT | Performed by: FAMILY MEDICINE

## 2025-05-20 PROCEDURE — 1126F AMNT PAIN NOTED NONE PRSNT: CPT | Performed by: FAMILY MEDICINE

## 2025-05-20 PROCEDURE — 3074F SYST BP LT 130 MM HG: CPT | Performed by: FAMILY MEDICINE

## 2025-05-20 PROCEDURE — 80048 BASIC METABOLIC PNL TOTAL CA: CPT | Performed by: FAMILY MEDICINE

## 2025-05-20 PROCEDURE — 36415 COLL VENOUS BLD VENIPUNCTURE: CPT | Performed by: FAMILY MEDICINE

## 2025-05-20 PROCEDURE — 99214 OFFICE O/P EST MOD 30 MIN: CPT | Mod: 25 | Performed by: FAMILY MEDICINE

## 2025-05-20 PROCEDURE — 80061 LIPID PANEL: CPT | Performed by: FAMILY MEDICINE

## 2025-05-20 ASSESSMENT — PAIN SCALES - GENERAL: PAINLEVEL_OUTOF10: NO PAIN (0)

## 2025-05-20 NOTE — PROGRESS NOTES
"Preventive Care Visit  [facility]  Yazan Sosa MD, Family Medicine  May 20, 2025      Assessment & Plan     Routine physical examination  Routine healthcare maintenance.  Preventative cares reviewed.  Annual physical exams to continue.    Overweight  Overweight status with BMI 28.79 with weight goal less than 200 pounds initially, less than 190 pounds ideally.  Fasting glucose and lipid cascade updated.  - Basic metabolic panel  - Lipid panel reflex to direct LDL Fasting  - Hemoglobin A1c    Serrated adenoma of colon  Serrated adenoma of colon on colonoscopy April 8, 2022 with 5-year follow-up recommendation.  - Basic metabolic panel    Congenital pes planus, unspecified laterality  Pes planus deformity right greater than left with ankle pronation.    Elevated prostate specific antigen (PSA)  Diagnostic PSA updated with history of mild PSA elevation February 18, 2022 with subsequent to recheck normal March 18, 2022.  - PSA tumor marker    Encounter for immunization  Pneumococcal 20 vaccination provided today.  - Pneumococcal 20 Valent Conjugate (PCV20)        The 10-year ASCVD risk score (Samantha MANDUJANO, et al., 2019) is: 5.8%    Values used to calculate the score:      Age: 59 years      Sex: Male      Is Non- : No      Diabetic: No      Tobacco smoker: No      Systolic Blood Pressure: 122 mmHg      Is BP treated: No      HDL Cholesterol: 52 mg/dL      Total Cholesterol: 161 mg/dL m    Patient has been advised of split billing requirements and indicates understanding: Yes        BMI  Estimated body mass index is 28.79 kg/m  as calculated from the following:    Height as of this encounter: 1.797 m (5' 10.75\").    Weight as of this encounter: 93 kg (205 lb).   Weight management plan: Discussed healthy diet and exercise guidelines    Counseling  Appropriate preventive services were addressed with this patient via screening, questionnaire, or discussion as appropriate for " "fall prevention, nutrition, physical activity, Tobacco-use cessation, social engagement, weight loss and cognition.  Checklist reviewing preventive services available has been given to the patient.  Reviewed patient's diet, addressing concerns and/or questions.           Candy Morales is a 59 year old, presenting for the following:  Physical (Pt is fasting)        2025     7:12 AM   Additional Questions   Roomed by [facility]    Patient seen today for annual physical exam.  Biometric paperwork needed.  Overweight status.  Serrated adenoma of colon with most recent colonoscopy 2022 with 5-year follow-up recommendation noted.  Pes planus deformity bilateral.  Mild ankle pronation.  Used to be a long-distance runner.  Does not have any foot or ankle issues at this time however.  Has not had pneumococcal 20 vaccination previously.  Did have 1 episode of PSA elevation with subsequent retest normal 2022.  Gets up about once per night perhaps on average to urinate.  Denies recent illness.  Comprehensive review of systems as above otherwise all negative.    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 pacemaker (shocked as an  with issues following...)   4 sis -   Fraternal twin bro Cecilio (his children with alpha-1 antitrypsan def requiring liver transplants)      Surgeries: left knee medial meniscus repair (Dr. Manley); s/p vasectomy   H/O jaw fracture during baseball   Tdap 10/13/06 (then Td 16)           Advance Care Planning    Patient states has Health Care Directive and will send to [employer] [employer].        5/15/2025   General Health   How would you rate your " overall physical health? Good   Feel stress (tense, anxious, or unable to sleep) Only a little   (!) STRESS CONCERN      5/15/2025   Nutrition   Three or more servings of calcium each day? Yes   Diet: Regular (no restrictions)   How many servings of fruit and vegetables per day? (!) 0-1   How many sweetened beverages each day? 0-1         5/15/2025   Exercise   Days per week of moderate/strenous exercise 6 days   Average minutes spent exercising at this level 50 min         5/15/2025   Social Factors   Frequency of gathering with friends or relatives Twice a week   Worry food won't last until get money to buy more No   Food not last or not have enough money for food? No   Do you have housing? (Housing is defined as stable permanent housing and does not include staying outside in a car, in a tent, in an abandoned building, in an overnight shelter, or couch-surfing.) Yes   Are you worried about losing your housing? No   Lack of transportation? No   Unable to get utilities (heat,electricity)? No         5/15/2025   Fall Risk   Fallen 2 or more times in the past year? No   Trouble with walking or balance? No          5/15/2025   Dental   Dentist two times every year? Yes         Today's PHQ-2 Score:       5/19/2025     9:53 AM   PHQ-2 ( 1999 [employer])   Q1: Little interest or pleasure in doing things 0   Q2: Feeling down, depressed or hopeless 0   PHQ-2 Score 0    Q1: Little interest or pleasure in doing things Not at all   Q2: Feeling down, depressed or hopeless Not at all   PHQ-2 Score 0       Patient-reported           5/15/2025   Substance Use   Alcohol more than 3/day or more than 7/wk No   Do you use any other substances recreationally? No     Social History     Tobacco Use    Smoking status: Never    Smokeless tobacco: Never   Substance Use Topics    Alcohol use: Yes    Drug use: No           5/15/2025   STI Screening   New sexual partner(s) since last STI/HIV test? No   Last PSA:   Prostate Specific Antigen Screen  "  Date Value Ref Range Status   02/18/2022 3.78 (H) 0.00 - 3.50 ug/L Final     PSA Tumor Marker   Date Value Ref Range Status   03/18/2022 1.12 0.00 - 3.50 ug/L Final     ASCVD Risk   The 10-year ASCVD risk score (Samantha MANDUJANO, et al., 2019) is: 5.8%    Values used to calculate the score:      Age: 59 years      Sex: Male      Is Non- : No      Diabetic: No      Tobacco smoker: No      Systolic Blood Pressure: 122 mmHg      Is BP treated: No      HDL Cholesterol: 52 mg/dL      Total Cholesterol: 161 mg/dL           Reviewed and updated as needed this visit by Provider                    No past medical history on file.  Past Surgical History:   Procedure Laterality Date    VASECTOMY       Lab work is in process  Labs reviewed in EPIC  BP Readings from Last 3 Encounters:   05/20/25 122/78   04/19/25 122/77   06/12/24 123/81    Wt Readings from Last 3 Encounters:   05/20/25 93 kg (205 lb)   04/19/25 97.1 kg (214 lb)   06/12/24 98.4 kg (217 lb)                  Patient Active Problem List   Diagnosis    Bunion    Pes Planus    Cat allergies    Tubular adenoma of colon    Serrated adenoma of colon     Past Surgical History:   Procedure Laterality Date    VASECTOMY         Social History     Tobacco Use    Smoking status: Never    Smokeless tobacco: Never   Substance Use Topics    Alcohol use: Yes     Family History   Problem Relation Age of Onset    Diabetes Father     Heart Disease Father          No current outpatient medications on file.     No Known Allergies      Review of Systems  Constitutional, HEENT, cardiovascular, pulmonary, GI, , musculoskeletal, neuro, skin, endocrine and psych systems are negative, except as otherwise noted.     Objective    Exam  /78   Pulse 60   Temp 98.2  F (36.8  C)   Resp 16   Ht 1.797 m (5' 10.75\")   Wt 93 kg (205 lb)   SpO2 99%   BMI 28.79 kg/m     Estimated body mass index is 28.79 kg/m  as calculated from the following:    Height as of " "this encounter: 1.797 m (5' 10.75\").    Weight as of this encounter: 93 kg (205 lb).      Physical Exam  GENERAL: alert and no distress.  BMI 28.79.  EYES: Eyes grossly normal to inspection, PERRL and conjunctivae and sclerae normal  HENT: ear canals and TM's normal, nose and mouth without ulcers or lesions  NECK: no adenopathy, no asymmetry, masses, or scars  RESP: lungs clear to auscultation - no rales, rhonchi or wheezes  CV: regular rate and rhythm, normal S1 S2, no S3 or S4, no murmur, click or rub, no peripheral edema  ABDOMEN: soft, nontender, no hepatosplenomegaly, no masses and bowel sounds normal   (male): normal male genitalia without lesions or urethral discharge, no hernia  RECTAL: normal sphincter tone, no rectal masses, prostate mildly enlarged right greater than left size, otherwise smooth, nontender without nodules or masses  MS: no gross musculoskeletal defects noted, no edema  SKIN: no suspicious lesions or rashes  NEURO: Normal strength and tone, mentation intact and speech normal  PSYCH: mentation appears normal, affect normal/bright        Signed Electronically by: Yazan Sosa MD    "